# Patient Record
(demographics unavailable — no encounter records)

---

## 2017-03-18 NOTE — RAD
HISTORY:

chest pain  



COMPARISON:

Comparison chest 05/10/2016 kidney may



TECHNIQUE:

Chest PA and lateral



FINDINGS:



LUNGS:

Re- demonstrated is a calcified granuloma left lateral lower lung 

field unchanged.



PLEURA:

No significant pleural effusion identified. No pneumothorax apparent.



CARDIOVASCULAR:

Normal.



OSSEOUS STRUCTURES:

No significant abnormalities.



VISUALIZED UPPER ABDOMEN:

Normal.



OTHER FINDINGS:

None.



IMPRESSION:

No acute consolidation.  Re- demonstrated is a calcified granuloma 

left lateral lower lung field

## 2017-03-18 NOTE — C.PDOC
History Of Present Illness


Patient is a 88 y/o female, with PMHx of COPD, that presents to the ED for 

evaluation of right lower back pain that radiates down to right leg for the 

last 3 days. Notes crampy thigh pain for last 3 days. Patient also complains of 

upper back pain worse on left side. Additionally, patient reports occasional 

shortness of breath for the last 2 months. Otherwise, denies any current 

shortness of breath, chest pain, dizziness, numbness, weakness, nausea, vomiting

, abdominal pain, or any other associated symptoms at this time.  





Time Seen by Provider: 17 11:19


Chief Complaint (Nursing): Back Pain


History Per: Patient


History/Exam Limitations: no limitations


Onset/Duration Of Symptoms: Days


Current Symptoms Are (Timing): Still Present


Recent travel outside of the [place]: No


Additional History Per: Patient





Past Medical History


Reviewed: Historical Data, Nursing Documentation, Vital Signs


Vital Signs: 


 Last Vital Signs











Temp  97.6 F   17 13:44


 


Pulse  75   17 13:44


 


Resp  18   17 13:44


 


BP  120/74   17 13:44


 


Pulse Ox  97   17 14:04














- Medical History


PMH: HTN


   Denies: Chronic Kidney Disease


Surgical History: Cholecystectomy


Family History: States: Unknown Family Hx





- Social History


Hx Tobacco Use: No


Hx Alcohol Use: No


Hx Substance Use: No





- Immunization History


Hx Tetanus Toxoid Vaccination: No


Hx Influenza Vaccination: No


Hx Pneumococcal Vaccination: No





Review Of Systems


Except As Marked, All Systems Reviewed And Found Negative.


Constitutional: Negative for: Fever, Chills


Cardiovascular: Negative for: Chest Pain, Palpitations


Respiratory: Negative for: Cough, Shortness of Breath


Gastrointestinal: Negative for: Nausea, Vomiting, Abdominal Pain


Genitourinary: Negative for: Dysuria, Incontinence


Musculoskeletal: Positive for: Back Pain, Leg Pain.  Negative for: Neck Pain


Neurological: Negative for: Weakness, Numbness, Headache, Dizziness





Physical Exam





- Physical Exam


Appears: Non-toxic, No Acute Distress


Skin: Normal Color, Warm, Dry, No Other (no erythema to lower extremity)


Head: Atraumatic, Normacephalic


Eye(s): bilateral: Normal Inspection, EOMI


Neck: Normal ROM, Supple


Chest: Symmetrical, No Tenderness


Cardiovascular: Rhythm Regular


Respiratory: Normal Breath Sounds, No Rales, No Rhonchi, No Wheezing


Gastrointestinal/Abdominal: Soft, No Tenderness


Back: No CVA Tenderness, No Vertebral Tenderness, Paraspinal Tenderness (

tenderness to trapezius), Other (kyphosis)


Extremity: Normal ROM, No Tenderness (no tenderness to hips or lower extremities

), No Pedal Edema, No Calf Tenderness, Capillary Refill (< 2 sec.), No Deformity

, No Swelling


Pulses: Left Dorsalis Pedis: Normal, Right Dorsalis Pedis: Normal


Neurological/Psych: Oriented x3, Normal Speech, Normal Cognition, Normal Motor, 

Normal Sensation





ED Course And Treatment





- Laboratory Results


Result Diagrams: 


 17 12:23





 17 12:23


Lab Interpretation: No Acute Changes


ECG: Interpreted By Me, Viewed By Me


ECG Rhythm: Sinus Rhythm


ECG Interpretation: No Acute Changes


Rate From EC (bpm)


O2 Sat by Pulse Oximetry: 97 (on RA)


Pulse Ox Interpretation: Normal





- Radiology


CXR: Interpreted by Me, Viewed By Me


CXR Interpretation: Yes: No Acute Disease


Progress Note: Labs, EKG, CXR ordered and reviewed. Patient was treated with IV 

fluids, and Toradol IVP in the ER. On reassessment, patient is resting 

comfortably, with no acute distress. She has no fever and stable vital signs. 

She reports improvemet of back pain. Patient is ambulatory in the emergency 

department with no signs of discomfort. Patient was advised to follow up with 

their physician in 1-2 days.





Medical Decision Making


Medical Decision Making: 


EKg





Disposition


Counseled Patient/Family Regarding: Need For Followup, Rx Given





- Disposition


Referrals: 


[facility] [Outside]


[facility] [Outside]


Disposition: HOME/ ROUTINE


Disposition Time: 13:25


Condition: STABLE


Additional Instructions: 


Vaya a jade mdico o la clnica en 2-5 steele sin falta, para mas evaluacin. Binger 

los medicamentos karime indicado. Volver a la wilfred de emergencia en cualquier 

momento si los sntomas persisten o empeoran.


Prescriptions: 


Ibuprofen [Motrin] 600 mg PO Q8 #30 tab


Instructions:  Back Pain (GEN)


Print Language: Tanzanian





- POA


Present On Arrival: None





- Clinical Impression


Clinical Impression: 


 Low back pain, Thoracic back pain





- PA / NP / Resident Statement


MD/DO has reviewed & agrees with the documentation as recorded.





- Scribe Statement


The provider has reviewed the documentation as recorded by the Scribe


Unique Alejandra





All medical record entries made by the Karynibe were at my direction and 

personally dictated by me. I have reviewed the chart and agree that the record 

accurately reflects my personal performance of the history, physical exam, 

medical decision making, and the department course for this patient. I have 

also personally directed, reviewed, and agree with the discharge instructions 

and disposition.

## 2017-03-19 NOTE — CARD
--------------- APPROVED REPORT --------------





EKG Measurement

Heart [id number]

IN [id number]

[id number]

[id number]

[id number]



<Conclusion>

Normal sinus rhythm

Low voltage QRS

Nonspecific ST abnormality

Abnormal ECG

## 2017-05-01 NOTE — C.PDOC
History Of Present Illness





88 y/o female presents to ED with complaints of  SOB and cough for few days. 

Patient also reports chest pain on breathing and cough. Denies fever or chills. 

Notes cough productive of yellow sputum and that shortness of breath has been 

occasional. Denies palpitations, abdominal pain, or other associated symptoms. 


Chief Complaint (Nursing): Cough, Cold, Congestion


History Per: Patient


History/Exam Limitations: no limitations


Current Symptoms Are (Timing): Still Present


Recent travel outside of the [place]: No





Past Medical History


Reviewed: Historical Data, Nursing Documentation, Vital Signs


Vital Signs: 


 Last Vital Signs











Temp  98.5 F   17 18:10


 


Pulse  66   17 18:10


 


Resp  18   17 18:10


 


BP  139/77   17 18:10


 


Pulse Ox  96   17 20:13














- Medical History


PMH: HTN


Surgical History: Cholecystectomy


Family History: States: Unknown Family Hx





- Social History


Hx Tobacco Use: No


Hx Alcohol Use: No


Hx Substance Use: No





- Immunization History


Hx Tetanus Toxoid Vaccination: No


Hx Influenza Vaccination: No


Hx Pneumococcal Vaccination: No





Review Of Systems


Except As Marked, All Systems Reviewed And Found Negative.


Constitutional: Negative for: Fever, Chills


Cardiovascular: Negative for: Palpitations


Respiratory: Positive for: Cough, Shortness of Breath, Sputum


Gastrointestinal: Negative for: Nausea, Vomiting, Abdominal Pain, Diarrhea


Skin: Negative for: Rash


Neurological: Negative for: Headache, Dizziness





Physical Exam





- Physical Exam


Appears: Non-toxic, No Acute Distress


Skin: Warm, Dry


Head: Atraumatic, Normacephalic


Oral Mucosa: Moist


Chest: Symmetrical


Cardiovascular: Rhythm Regular


Respiratory: No Accessory Muscle Use, No Rales, Rhonchi (expiratory, bilaterally

), No Stridor, No Wheezing


Gastrointestinal/Abdominal: Soft, No Tenderness


Back: Normal Inspection


Extremity: Normal ROM, Capillary Refill (< 2 sec. )


Neurological/Psych: Oriented x3





ED Course And Treatment





- Laboratory Results


Result Diagrams: 


 17 19:50





 17 19:50


ECG: Interpreted By Me, Viewed By Me


ECG Rhythm: Sinus Rhythm


ECG Interpretation: Normal, No Acute Changes


Interpretation Of ECG: NSR, normal tracings.


Rate From EC


O2 Sat by Pulse Oximetry: 96 (ra)


Pulse Ox Interpretation: Normal





- Radiology


CXR: Interpreted by Me, Viewed By Me


CXR Interpretation: Yes: No Acute Disease.  No: Infiltrates, Cardiomegaly





Disposition


Counseled Patient/Family Regarding: Diagnosis





- Disposition


Referrals: 


[facility] [Outside]


Disposition: HOME/ ROUTINE


Disposition Time: 20:14


Condition: STABLE


Prescriptions: 


Albuterol HFA [Ventolin HFA 90 mcg/actuation (8 g)] 1 puff IH Q6 #1 inhaler


Azithromycin [Zithromax] 500 mg PO DAILY #7 tablet


Naproxen [Naprosyn Tab] 375 mg PO TIDPC #14 tab


Promethazine DM [Phenergan DM Syrup] 5 ml PO TID #20 cup


Instructions:  Upper Respiratory Infection (ED), Acute Bronchitis (ED)


Forms:  Gen Discharge Inst Iraqi


Print Language: Danish





- POA


Present On Arrival: None





- Clinical Impression


Clinical Impression: 


 Bronchitis, Upper respiratory infection








- Scribe Statement


The provider has reviewed the documentation as recorded by the Grey Hart 





Provider Scribe Attestation:


All medical record entries made by the Karynibzev were at my direction and 

personally dictated by me. I have reviewed the chart and agree that the record 

accurately reflects my personal performance of the history, physical exam, 

medical decision making, and the department course for this patient. I have 

also personally directed, reviewed, and agree with the discharge instructions 

and disposition.

## 2017-05-02 NOTE — RAD
HISTORY:

Shortness of breath



COMPARISON:

03/18/2017



TECHNIQUE:

Chest PA and lateral



FINDINGS:



LUNGS:

Biapical pleural thickening with upper lobe granulomatous changes.  

Two small nodular densities at the lateral aspect of the left lung 

base measuring up to 6 and 4 millimeters respectively. These were 

noted on the prior study.  Correlation with chest CT may be helpful. 

No focal infiltrate or effusion.



PLEURA:

No significant pleural effusion identified. No pneumothorax apparent.



CARDIOVASCULAR:

Calcification at the aortic knob.



OSSEOUS STRUCTURES:

No significant abnormalities.



VISUALIZED UPPER ABDOMEN:

Normal.



OTHER FINDINGS:

None.



IMPRESSION:





Biapical pleural thickening with upper lobe granulomatous changes.  

Two small nodular densities at the lateral aspect of the left lung 

base measuring up to 6 and 4 millimeters respectively. These were 

noted on the prior study.  Correlation with chest CT may be helpful. 

No focal infiltrate or effusion.

## 2017-05-03 NOTE — CARD
--------------- APPROVED REPORT --------------





EKG Measurement

Heart [id number]

WA 166P63

[id number]

[id number]

[id number]



<Conclusion>

Normal sinus rhythm

Normal ECG

## 2018-03-19 NOTE — RAD
HISTORY:

cough  



COMPARISON:

5/1/2017



TECHNIQUE:

Chest PA and lateral



FINDINGS:



LUNGS:

No pulmonary infiltrate.  Calcified granuloma at left lung, unchanged.



PLEURA:

No significant pleural effusion identified. No pneumothorax apparent.



CARDIOVASCULAR:

Normal.



OSSEOUS STRUCTURES:

No significant abnormalities.



VISUALIZED UPPER ABDOMEN:

Normal.



OTHER FINDINGS:

None.



IMPRESSION:

No active disease.

## 2018-03-19 NOTE — C.PDOC
History Of Present Illness


88 year old female presents to the ER with a complaint of a dry cough for the 

past 2 weeks, associated with occasional pleuritic pain. Patient has not seen 

her PMD for her symptoms. Denies Hx of COPD, asthma, smoking, fever, dyspnea on 

exertion, SOB, or other associated symptoms.








DRY COUGH X 2 WEEKS. NO FEVER, STERLING, SOB. OCC PLEURITIC PAIN. DENIES OTHER ASSOC 

SX. DID NOT SEE PMD FOR SAME. DENIES HO COPD, ASTHMA SMOKING





EXAM


NARD NONTOXIC


LUNGS OCC DRY COUGH CTA B/L NO W/R/R


REMAINDER NEG


Time Seen by Provider: 03/19/18 09:01


Chief Complaint (Nursing): Cough, Cold, Congestion


History Per: Patient


History/Exam Limitations: no limitations


Onset/Duration Of Symptoms: Days


Current Symptoms Are (Timing): Still Present


Location Of Pain: None


Sick Contacts (Context): None


Associated Symptoms: Cough, Other ((+) Occasional pleuritic pain. (-) STERLING, SOB)

.  denies: Fever, Sputum


Ear Symptoms: Bilateral: None


Recent travel outside of the [place]: No





Past Medical History


Reviewed: Historical Data, Nursing Documentation, Vital Signs


Vital Signs: 


 Last Vital Signs











Temp  99.3 F   03/19/18 08:56


 


Pulse  86   03/19/18 08:56


 


Resp  18   03/19/18 08:56


 


BP  122/72   03/19/18 08:56


 


Pulse Ox  98   03/19/18 09:39














- Medical History


PMH: HTN


Surgical History: Cholecystectomy


Family History: States: Unknown Family Hx





- Social History


Hx Tobacco Use: No


Hx Alcohol Use: No


Hx Substance Use: No





- Immunization History


Hx Tetanus Toxoid Vaccination: No


Hx Influenza Vaccination: No


Hx Pneumococcal Vaccination: No





Review Of Systems


Except As Marked, All Systems Reviewed And Found Negative.


Constitutional: Negative for: Fever


Respiratory: Positive for: Cough, Pleuritic Pain (Occasional).  Negative for: 

Shortness of Breath, SOB with Excertion, Sputum





Physical Exam





- Physical Exam


Appears: Non-toxic, Other (No acute respiratory distress)


Skin: Normal Color, Warm, Dry


Head: Atraumatic, Normacephalic


Eye(s): bilateral: Normal Inspection


Oral Mucosa: Moist


Throat: Normal, No Erythema, No Exudate


Chest: Symmetrical, No Tenderness


Cardiovascular: Rhythm Regular


Respiratory: No Rales, No Rhonchi, No Wheezing, Other (Occasional dry cough)


Neurological/Psych: Oriented x3, Normal Speech





ED Course And Treatment


O2 Sat by Pulse Oximetry: 98


Pulse Ox Interpretation: Normal





- Radiology


CXR: Interpreted by Me


CXR Interpretation: Yes: No Acute Disease





Medical Decision Making


Medical Decision Making: 





Plan:


* CXR





Disposition


Counseled Patient/Family Regarding: Studies Performed, Diagnosis, Need For 

Followup, Rx Given





- Disposition


Referrals: 


YOUR,PMD [Other]


Disposition: HOME/ ROUTINE


Disposition Time: 09:39


Condition: GOOD


Prescriptions: 


Azithromycin 250 mg PO DAILY #6 tab


Benzonatate [Tessalon Perles] 200 mg PO TID PRN #15 sgl


 PRN Reason: Cough


Instructions:  Acute Bronchitis


Forms:  [employer] (English)


Print Language: Qatari





- Clinical Impression


Clinical Impression: 


 Bronchitis








- Scribe Statement


The provider has reviewed the documentation as recorded by the Karynibzev Yepez





All medical record entries made by the Karynibzev were at my direction and 

personally dictated by me. I have reviewed the chart and agree that the record 

accurately reflects my personal performance of the history, physical exam, 

medical decision making, and the department course for this patient. I have 

also personally directed, reviewed, and agree with the discharge instructions 

and disposition.

## 2018-04-06 NOTE — C.PDOC
History Of Present Illness


88 year old female presents to the ED for evaluation of cough and cold symptoms 

for 4 days. Patient reports her cough is productive of white sputum and she has 

anterior chest wall pain while coughing. Patient denies fever, chills. 


Chief Complaint (Nursing): Flu-like Symptoms


History Per: Patient


History/Exam Limitations: no limitations


Onset/Duration Of Symptoms: Days (4)


Current Symptoms Are (Timing): Still Present


Associated Symptoms: Cough, Sputum (white).  denies: Fever


Additional History Per: Patient





Past Medical History


Reviewed: Historical Data, Nursing Documentation, Vital Signs


Vital Signs: 


 Last Vital Signs











Temp  98.7 F   18 19:47


 


Pulse  103 H  18 19:47


 


Resp  20   18 19:47


 


BP  127/55 L  18 19:47


 


Pulse Ox  97   18 19:50














- Medical History


PMH: HTN


   Denies: Chronic Kidney Disease


Surgical History: Cholecystectomy


Family History: States: Unknown Family Hx





- Social History


Hx Tobacco Use: No


Hx Alcohol Use: No


Hx Substance Use: No





- Immunization History


Hx Tetanus Toxoid Vaccination: No


Hx Influenza Vaccination: Yes (2017)


Hx Pneumococcal Vaccination: No





Review Of Systems


Constitutional: Negative for: Fever, Chills


Cardiovascular: Positive for: Other (anterior chest wall pain while coughing)


Respiratory: Positive for: Cough, Sputum (white)





Physical Exam





- Physical Exam


Appears: Non-toxic, No Acute Distress


Skin: Normal Color, Warm, Dry


Head: Atraumatic, Normacephalic


Eye(s): bilateral: Normal Inspection


Oral Mucosa: Moist


Neck: Supple


Chest: Symmetrical, No Deformity, No Tenderness


Cardiovascular: Rhythm Regular, No Murmur


Respiratory: Normal Breath Sounds, No Rales, No Rhonchi, No Wheezing


Extremity: Normal ROM, Capillary Refill (less than 2 seconds )


Neurological/Psych: Oriented x3, Normal Speech, Normal Cognition





ED Course And Treatment





- Laboratory Results


Result Diagrams: 


 18 19:06





 18 19:06


ECG: Interpreted By Me, Viewed By Me


ECG Rhythm: Sinus Rhythm


ECG Interpretation: Normal, No Acute Changes


Interpretation Of ECG: NSR, normal tracings


Rate From EC


O2 Sat by Pulse Oximetry: 97 (on RA)


Pulse Ox Interpretation: Normal





- Radiology


CXR: Interpreted by Me, Viewed By Me


CXR Interpretation: Yes: No Acute Disease.  No: Infiltrates, Cardiomegaly


Progress Note: Bloodwork, CXR, EKG, Influenza A/B swab ordered and reviewed.





Disposition


Counseled Patient/Family Regarding: Diagnosis





- Disposition


Referrals: 


 at [facility] [Outside]


Disposition: HOME/ ROUTINE


Disposition Time: 19:46


Condition: STABLE


Prescriptions: 


Azithromycin 1 tab PO DAILY #4 tab


guaiFENesin/Dextromethorphan [guaiFENesin-DM] 5 ml PO TID #120 ml


Instructions:  Acute Bronchitis, Adult (DC), Upper Respiratory Infection (ED)


Forms:  [employer] (English), Gen Discharge Inst Bahamian


Print Language: Bengali





- Clinical Impression


Clinical Impression: 


 Upper respiratory infection, Bronchitis








- Scribe Statement


The provider has reviewed the documentation as recorded by the Scribe (Gale Alejandra)


Provider Attestation: 








All medical record entries made by the Scribe were at my direction and 

personally dictated by me. I have reviewed the chart and agree that the record 

accurately reflects my personal performance of the history, physical exam, 

medical decision making, and the department course for this patient. I have 

also personally directed, reviewed, and agree with the discharge instructions 

and disposition.

## 2018-04-07 NOTE — RAD
Chest x-ray two views 



History: Chest pain. 



Comparison: 03/19/2018 



Findings: 



Biapical pleural thickening with upper lobe granulomatous changes. 



Small nodule and or granuloma at lateral aspect of the left britton lung 

base. 



Focal consolidative changes seen in the infrahilar regions 

bilaterally ; right greater than left.  Clinical correlation. 



Calcification at the aortic knob. 



Degenerative changes in the spine and shoulders. 



Impression:



Biapical pleural thickening with upper lobe granulomatous changes. 



Small nodule and or granuloma at lateral aspect of the left lung 

base. 



Focal consolidative changes seen in the infrahilar regions 

bilaterally ; right greater than left.  Clinical correlation. 



Calcification at the aortic knob. 



Degenerative changes in the spine and shoulders. 



Followup exam would be helpful to confirm stability of the nodule and 

infrahilar consolidative regions.  Clinical correlation.

## 2018-04-09 NOTE — CARD
--------------- APPROVED REPORT --------------





EKG Measurement

Heart [id number]

MO 132P48

[id number]

[id number]

[id number]



<Conclusion>

Normal sinus rhythm

Low voltage QRS

Borderline ECG

## 2018-04-09 NOTE — RAD
HISTORY:

SOB  



COMPARISON:

04/06/2018



TECHNIQUE:

Chest PA and lateral



FINDINGS:



LUNGS:

No active pulmonary disease.



PLEURA:

No significant pleural effusion identified. No pneumothorax apparent.



CARDIOVASCULAR:

 No radiographic findings to suggest acute or significant 

cardiovascular disease.



OSSEOUS STRUCTURES:

No significant abnormalities.



VISUALIZED UPPER ABDOMEN:

Normal.



OTHER FINDINGS:

None.



IMPRESSION:

No active disease. No significant interval change compared to the 

prior examination(s).

## 2018-04-09 NOTE — C.PDOC
History Of Present Illness


89 y/o female with history of Pulmonary fibrosis presents to ED with complaints 

of persistent cough for 1 month. Notes she has chest pain and sob with 

persistent cough. Patient has been seen at ED twice for same symptoms, 

prescribed azithromycin without improvement. As per family at bedside patient 

has similar episodes in 2016 where she was admitted for pneumonia. Denies fever

, abdominal pain, nausea, leg swelling or any other complaints at this time. 


Time Seen by Provider: 18 17:12


Chief Complaint (Nursing): Cough, Cold, Congestion


History Per: Patient, Family


History/Exam Limitations: no limitations


Onset/Duration Of Symptoms: Days


Current Symptoms Are (Timing): Still Present


Associated Symptoms: Cough





Past Medical History


Reviewed: Historical Data, Nursing Documentation, Vital Signs


Vital Signs: 


 Last Vital Signs











Temp  97.7 F   04/10/18 07:40


 


Pulse  75   04/10/18 07:40


 


Resp  20   04/10/18 07:40


 


BP  120/69   04/10/18 07:40


 


Pulse Ox  95   04/10/18 07:40














- Medical History


PMH: HTN


Surgical History: Cholecystectomy


Family History: States: No Known Family Hx





- Social History


Hx Tobacco Use: No


Hx Alcohol Use: No


Hx Substance Use: No





- Immunization History


Hx Tetanus Toxoid Vaccination: No


Hx Influenza Vaccination: Yes (2017)


Hx Pneumococcal Vaccination: No





Review Of Systems


Constitutional: Negative for: Fever, Chills


Cardiovascular: Positive for: Chest Pain


Respiratory: Positive for: Cough.  Negative for: Shortness of Breath


Gastrointestinal: Negative for: Nausea, Vomiting


Skin: Negative for: Rash





Physical Exam





- Physical Exam


Appears: Non-toxic, No Acute Distress


Skin: Normal Color, Warm, Dry, No Rash


Head: Atraumatic, Normacephalic


Eye(s): bilateral: Normal Inspection, PERRL, EOMI


Nose: Normal


Oral Mucosa: Moist


Throat: Normal, No Erythema, No Exudate


Neck: Normal ROM, Supple


Chest: Symmetrical


Cardiovascular: Rhythm Regular


Respiratory: Normal Breath Sounds, No Rales, No Rhonchi, No Wheezing


Gastrointestinal/Abdominal: Soft, No Tenderness, No Guarding, No Rebound


Extremity: Normal ROM, Capillary Refill (<2 seconds)


Neurological/Psych: Oriented x3, Normal Speech





ED Course And Treatment





- Laboratory Results


Result Diagrams: 


 04/10/18 07:38





 04/10/18 07:38


ECG: Interpreted By Me, Viewed By Me


ECG Rhythm: Sinus Rhythm


Interpretation Of ECG: Normal Sinus Rhythm at rate 69 bpm.


Rate From EC


O2 Sat by Pulse Oximetry: 96 (RA)


Pulse Ox Interpretation: Normal


Progress Note: Blood work, ECG ordered. Albuterol, Neb treatment administered.  

On re-evaluation, pt notes mild improvement.  Case discussed with Dr Mccabe, 

agreed upon plan and treatment.  Case discussed with Dr Rivera, agreed upon 

admission.





Disposition





- Disposition


Disposition: HOSPITALIZED


Disposition Time: 18:00


Condition: STABLE





- Clinical Impression


Clinical Impression: 


 Pneumonia, Chest pain, Bronchitis








- PA / NP / Resident Statement


MD/DO has reviewed & agrees with the documentation as recorded.





- Scribe Statement


The provider has reviewed the documentation as recorded by the Karynibzev Hawthorne





All medical record entries made by the Grey were at my direction and 

personally dictated by me. I have reviewed the chart and agree that the record 

accurately reflects my personal performance of the history, physical exam, 

medical decision making, and the department course for this patient. I have 

also personally directed, reviewed, and agree with the discharge instructions 

and disposition.

## 2018-04-09 NOTE — CP.PCM.HP
History of Present Illness





- History of Present Illness


History of Present Illness: 





88 year old female with past medical history of pulmonary fibrosis presented to 

hospital for 1 month of productive cough.  Patient is accompanied with daughter 

who helps translate.  Patient had productive cough with chest congestion for 

past month.  Patient was seen in ED for these symptoms last week (4/6/18) and 

was given Zithromax which did not help her symptoms.  CXR done on 4/6/18 was 

negative for acute disease.  Patient also had Patient denies having any chest 

pain, shortness of breath, fevers, chills. 





PMD: Dr. Vega


PMH: Pulmonary Fibrosis (unknown origin)


PSH: cholecystectomy, cataracts surgery


Allergies: Penicillin


Home Meds: denies


FHX: mother and sister had breast CA


Social: denies hx of tobacco, etoh and drug use. 





Present on Admission





- Present on Admission


Any Indicators Present on Admission: No





Review of Systems





- Constitutional


Constitutional: absent: Chills, Fever





- EENT


Eyes: absent: Change in Vision, Itchy Eyes


Ears: absent: Decreased Hearing, Ear Pain


Nose/Mouth/Throat: absent: Nasal Discharge, Sinus Pain, Sore Throat





- Cardiovascular


Cardiovascular: absent: Chest Pain, Dyspnea, Leg Edema





- Respiratory


Respiratory: Cough, Wheezing, Chest Congestion.  absent: Dyspnea





- Gastrointestinal


Gastrointestinal: absent: Abdominal Pain, Constipation, Diarrhea, Nausea, 

Vomiting





- Genitourinary


Genitourinary: absent: Dysuria, Urinary Frequency





- Musculoskeletal


Musculoskeletal: absent: Back Pain, Muscle Weakness





- Integumentary


Integumentary: absent: Acne, Lesions, Rash





- Neurological


Neurological: absent: Dizziness, Headaches





- Psychiatric


Psychiatric: absent: Anxiety, Confusion, Depression





- Hematologic/Lymphatic


Hematologic: absent: Easy Bleeding, Easy Bruising





Past Patient History





- Infectious Disease


Hx of Infectious Diseases: None





- Past Medical History & Family History


Past Medical History?: Yes





- Past Social History


Smoking Status: Never Smoked


Chewing Tobacco Use: No


Cigar Use: No


Alcohol: None


Drugs: Denies


Home Situation {Lives}: With Family





- CARDIAC


Hx Hypertension: Yes





- PULMONARY


Other/Comment: PULMONARY FIBROSIS





- NEUROLOGICAL


Hx Neurological Disorder: No





- HEENT


Hx HEENT Problems: No





- RENAL


Hx Chronic Kidney Disease: No





- ENDOCRINE/METABOLIC


Hx Endocrine Disorders: No





- HEMATOLOGICAL/ONCOLOGICAL


Hx Blood Disorders: No





- INTEGUMENTARY


Hx Dermatological Problems: No





- MUSCULOSKELETAL/RHEUMATOLOGICAL


Hx Musculoskeletal Disorders: No


Hx Falls: No (DENIES)





- GASTROINTESTINAL


Hx Gastrointestinal Disorders: No





- GENITOURINARY/GYNECOLOGICAL


Hx Genitourinary Disorders: No





- PSYCHIATRIC


Hx Substance Use: No





- SURGICAL HISTORY


Hx Cholecystectomy: Yes





- ANESTHESIA


Hx Anesthesia: Yes


Hx Anesthesia Reactions: No


Hx Malignant Hyperthermia: No





Meds


Allergies/Adverse Reactions: 


 Allergies











Allergy/AdvReac Type Severity Reaction Status Date / Time


 


Penicillins AdvReac Severe ANAPHYLAXIS Verified 04/06/18 18:15














Physical Exam





- Constitutional


Appears: Non-toxic, No Acute Distress





- Head Exam


Head Exam: ATRAUMATIC





- Eye Exam


Eye Exam: EOMI





- ENT Exam


ENT Exam: Mucous Membranes Moist





- Respiratory Exam


Respiratory Exam: Wheezes (expiratory wheezing B/L ).  absent: Accessory Muscle 

Use, Rales, Rhonchi





- Cardiovascular Exam


Cardiovascular Exam: REGULAR RHYTHM, +S1, +S2.  absent: Diastolic murmur, Gallop

, Rubs, Systolic Murmur





- GI/Abdominal Exam


GI & Abdominal Exam: Normal Bowel Sounds.  absent: Distended, Firm, Guarding, 

Rigid, Soft, Tenderness





- Extremities Exam


Extremities exam: Negative for: pedal edema, tenderness





- Neurological Exam


Neurological exam: Alert, Oriented x3





- Psychiatric Exam


Psychiatric exam: Normal Affect, Normal Mood





- Skin


Skin Exam: Dry, Intact, Normal Color, Warm





Results





- Vital Signs


Recent Vital Signs: 





 Last Vital Signs











Temp  100.3 F H  04/09/18 16:21


 


Pulse  70   04/09/18 16:21


 


Resp  18   04/09/18 16:21


 


BP  123/75   04/09/18 16:21


 


Pulse Ox  96   04/09/18 19:05














- Labs


Result Diagrams: 


 04/09/18 17:34





 04/09/18 17:34


Labs: 





 Laboratory Results - last 24 hr











  04/09/18 04/09/18





  17:34 17:34


 


WBC  6.2  D 


 


RBC  4.50 


 


Hgb  13.4 


 


Hct  40.5 


 


MCV  89.9 


 


MCH  29.8 


 


MCHC  33.1 


 


RDW  13.9 


 


Plt Count  190 


 


MPV  9.4 


 


Neut % (Auto)  66.7 


 


Lymph % (Auto)  25.1 


 


Mono % (Auto)  7.6 


 


Eos % (Auto)  0.4 


 


Baso % (Auto)  0.2 


 


Neut # (Auto)  4.1 


 


Lymph # (Auto)  1.6 


 


Mono # (Auto)  0.5 


 


Eos # (Auto)  0.0 


 


Baso # (Auto)  0.0 


 


Sodium   143


 


Potassium   3.9


 


Chloride   101


 


Carbon Dioxide   29


 


Anion Gap   17


 


BUN   21 H


 


Creatinine   0.7


 


Est GFR ( Amer)   > 60


 


Est GFR (Non-Af Amer)   > 60


 


Random Glucose   102


 


Calcium   8.9


 


Total Bilirubin   0.6


 


AST   23


 


ALT   24


 


Alkaline Phosphatase   49


 


Total Creatine Kinase   136 H


 


CK-MB (Mass)   1.81


 


Troponin I   < 0.0120


 


NT-Pro-B Natriuret Pep   193


 


Total Protein   8.2


 


Albumin   4.1


 


Globulin   4.0 H


 


Albumin/Globulin Ratio   1.0














Assessment & Plan





- Assessment and Plan (Free Text)


Assessment: 





88 year old female with past medical history of pulmonary fibrosis is admitted 

for bronchitis vs. suspicious pneumonia.  Patient has low grade temp on 

admission of 100.3 degrees.  No SIRS criteria met on admission though.  CXR was 

normal and showed no evidence of active disease.  Pneumonia Severity Index was 

88 points meaning 0.9-2.8% mortality risk. On clinic exam though, patient has 

chest congestion with wheezing.  





Bronchitis vs. PNA 


-Patient received Zithromax and Avelox in the ED


- Will continue Avelox 400 mg IV q24H 


- Will check Rapid Flu and sputum cultures.  


- Will check procal level


- Tylenol prn for fevers


- Mucinex BID for congestion


- Duoneb prn





Pulmonary fibrosis


- Will continue to monitor.


- Will recommend O/P pulm follow up 





Prophylaxis


- SCDs


- Lovenox SC





Case discussed with attending, Dr. Dutta.  





- Date & Time


Date: 04/09/18


Time: 19:24

## 2018-04-10 NOTE — CP.PCM.PN
Subjective





- Date & Time of Evaluation


Date of Evaluation: 04/10/18


Time of Evaluation: 07:00





- Subjective


Subjective: 


PGY1 Medicine Note for Dr. Dutta





Patient seen and examined at bedside and in no acute distress. Patient still 

having productive cough with white phlegm especially at night. Patient says her 

breathing is better, but still feels some shortness of breath. Patient denies 

any chest pain, abdominal pain, nausea, vomiting, diarrhea, or constipation. 





Objective





- Vital Signs/Intake and Output


Vital Signs (last 24 hours): 


 











Temp Pulse Resp BP Pulse Ox


 


 97.7 F   100 H  20   120/69   96 


 


 04/10/18 07:40  04/10/18 07:45  04/10/18 07:40  04/10/18 07:40  04/10/18 09:23











- Medications


Medications: 


 Current Medications





Acetaminophen (Tylenol 325mg Tab)  650 mg PO Q6 PRN


   PRN Reason: Pain, Mild (1-3)


Albuterol/Ipratropium (Duoneb 3 Mg/0.5 Mg (3 Ml) Ud)  3 ml INH RQ4 [facility]


   Last Admin: 04/10/18 11:01 Dose:  3 ml


Enoxaparin Sodium (Lovenox)  30 mg SC DAILY [facility]


   Last Admin: 04/10/18 10:33 Dose:  30 mg


Guaifenesin (Mucinex La)  600 mg PO BID [facility]


   Last Admin: 04/10/18 10:32 Dose:  600 mg


Moxifloxacin HCl (Avelox)  400 mg PO Q24H [facility]


   Last Admin: 04/09/18 21:08 Dose:  400 mg











- Labs


Labs: 


 





 04/10/18 07:38 





 04/10/18 07:38 











- Additional Findings


Additional findings: 


- Constitutional


Appears: Non-toxic, No Acute Distress





- Head Exam


Head Exam: ATRAUMATIC





- Eye Exam


Eye Exam: EOMI





- ENT Exam


ENT Exam: Mucous Membranes Moist





- Respiratory Exam


Respiratory Exam: decreased breath sounds b/l.  absent: Accessory Muscle Use, 

Rales, Rhonchi





- Cardiovascular Exam


Cardiovascular Exam: REGULAR RHYTHM, +S1, +S2.  absent: Diastolic murmur, Gallop

, Rubs, Systolic Murmur





- GI/Abdominal Exam


GI & Abdominal Exam: Normal Bowel Sounds.  absent: Distended, Firm, Guarding, 

Rigid, Soft, Tenderness





- Extremities Exam


Extremities exam: Negative for: pedal edema, tenderness





- Neurological Exam


Neurological exam: Alert, Oriented x3





- Psychiatric Exam


Psychiatric exam: Normal Affect, Normal Mood





- Skin


Skin Exam: Dry, Intact, Normal Color, Warm





Assessment and Plan





- Assessment and Plan (Free Text)


Assessment: 


Bronchitis vs. PNA 


-Patient received Zithromax and Avelox in the ED


- Will continue Avelox 400 mg po q24H 


- Rapid flu negative


- F/u Sputum culture


- Procalc <.05


Meds:


- Tylenol prn for fevers


- Mucinex BID for congestion


- Duoneb prn





Pulmonary fibrosis


- Will continue to monitor.


- Will recommend O/P pulm follow up 


- f/u CT lung





Prophylaxis


- SCDs


- Lovenox SC





Case discussed with attending, Dr. Dutta.

## 2018-04-10 NOTE — CARD
--------------- APPROVED REPORT --------------





EKG Measurement

Heart [id number]

[id number]

[id number]

[id number]

[id number]



<Conclusion>

Normal sinus rhythm

Low voltage QRS

Borderline ECG

## 2018-04-10 NOTE — CT
PROCEDURE:  CT Chest without contrast



HISTORY:

pulm fibrosis, SOB



COMPARISON:

None.



TECHNIQUE:

Contiguous axial images were obtained through the chest without 

intravenous contrast enhancement. Sagittal and coronal 

reconstructions were performed.







Radiation dose (DLP): 399.96 mGy-cm. 



This CT exam was performed using one or more of the following dose 

reduction techniques: Automated exposure control, adjustment of the 

mA and/or kV according to patient size, and/or use of iterative 

reconstruction technique.



FINDINGS:



LUNGS:

No infiltrate.  Right lower lobe linear scar/ atelectasis.  Left 

lower lobe calcified granuloma.  No other pulmonary mass. 



MEDIASTINUM:

Unremarkable thoracic aorta. No aneurysm. Normal-sized heart.  

Coronary arterial calcification. No pericardial effusion. Main 

pulmonary artery unremarkable. No vascular congestion. No 

lymphadenopathy.



PLEURA:

No pleural fluid. No pneumothorax.



BONES:

No fracture. No destructive lesion. 



UPPER ABDOMEN:

Numerous low-density masses throughout the liver, likely cysts. No 

biliary dilatation. Curvilinear mural calcification in some of these 

cysts in the anterior inferior right lobe. Very small hiatal hernia.



OTHER FINDINGS:

None.



IMPRESSION:

No evidence of pulmonary fibrosis. No acute infiltrate. Minor 

findings as above.

## 2018-04-11 NOTE — CP.PCM.PN
Subjective





- Date & Time of Evaluation


Date of Evaluation: 04/11/18


Time of Evaluation: 07:00





- Subjective


Subjective: 


PGY1 Medicine Note for Dr. Dutta





Patient seen and examined at bedside and in no acute distress. Patient still 

having productive cough with white phlegm especially at night. Patient says her 

breathing is better, but still feels some shortness of breath. Patient denies 

any chest pain, abdominal pain, nausea, vomiting, diarrhea, or constipation. 











Objective





- Vital Signs/Intake and Output


Vital Signs (last 24 hours): 


 











Temp Pulse Resp BP Pulse Ox


 


 97.8 F   90   20   124/71   96 


 


 04/10/18 23:15  04/10/18 23:15  04/10/18 23:15  04/10/18 23:15  04/11/18 05:15











- Medications


Medications: 


 Current Medications





Acetaminophen (Tylenol 325mg Tab)  650 mg PO Q6 PRN


   PRN Reason: Pain, Mild (1-3)


Albuterol/Ipratropium (Duoneb 3 Mg/0.5 Mg (3 Ml) Ud)  3 ml INH RQ4 [facility]


   Last Admin: 04/11/18 04:49 Dose:  Not Given


Enoxaparin Sodium (Lovenox)  30 mg SC DAILY [facility]


   Last Admin: 04/10/18 10:33 Dose:  30 mg


Guaifenesin (Mucinex La)  600 mg PO BID [facility]


   Last Admin: 04/10/18 17:44 Dose:  600 mg


Moxifloxacin HCl (Avelox)  400 mg PO Q24H [facility]


   Last Admin: 04/10/18 20:40 Dose:  400 mg











- Labs


Labs: 


 





 04/10/18 07:38 





 04/10/18 07:38 











- Additional Findings


Additional findings: 


- Constitutional


Appears: Non-toxic, No Acute Distress





- Head Exam


Head Exam: ATRAUMATIC





- Eye Exam


Eye Exam: EOMI





- ENT Exam


ENT Exam: Mucous Membranes Moist





- Respiratory Exam


Respiratory Exam: decreased breath sounds b/l.  absent: Accessory Muscle Use, 

Rales, Rhonchi





- Cardiovascular Exam


Cardiovascular Exam: REGULAR RHYTHM, +S1, +S2.  absent: Diastolic murmur, Gallop

, Rubs, Systolic Murmur





- GI/Abdominal Exam


GI & Abdominal Exam: Normal Bowel Sounds.  absent: Distended, Firm, Guarding, 

Rigid, Soft, Tenderness





- Extremities Exam


Extremities exam: Negative for: pedal edema, tenderness





- Neurological Exam


Neurological exam: Alert, Oriented x3





- Psychiatric Exam


Psychiatric exam: Normal Affect, Normal Mood





- Skin


Skin Exam: Dry, Intact, Normal Color, Warm











Assessment and Plan





- Assessment and Plan (Free Text)


Assessment: 


Bronchitis vs. PNA 


-Patient received Zithromax and Avelox in the ED


- Will continue Avelox 400 mg po q24H 


- Rapid flu negative


- F/u Sputum culture


- Procalc <.05


Meds:


- Tylenol prn for fevers


- Mucinex BID for congestion


- Duoneb prn





Pulmonary fibrosis


- Will continue to monitor.


- Will recommend O/P pulm follow up 


- CT lung: Right lower lobe linear scar/ atelectasis. Left lower lobe calcified 

granuloma. No evidence of pulmonary fibrosis. No acute infiltrate.





Prophylaxis


- SCDs


- Lovenox SC





Case discussed with attending, Dr. Dutta.

## 2018-04-11 NOTE — CP.PCM.DIS
Provider





- Provider


Date of Admission: 


04/09/18 18:16





Attending physician: 


Lulu Rivera MD





Primary care physician: 


Dr. Vega


Time Spent in preparation of Discharge (in minutes): 40





Diagnosis





- Discharge Diagnosis


(1) Bronchitis


Status: Resolved   





Hospital Course





- Lab Results


Lab Results: 


 Micro Results





04/09/18 22:30   Blood   Blood Culture - Preliminary


                            NO GROWTH AFTER 24 HOURS


04/09/18 22:30   Blood   Blood Culture - Preliminary


                            NO GROWTH AFTER 24 HOURS





 Most Recent Lab Values











WBC  4.0 K/uL (4.8-10.8)  L  04/11/18  07:50    


 


RBC  4.23 Mil/uL (3.80-5.20)   04/11/18  07:50    


 


Hgb  12.6 g/dL (11.0-16.0)   04/11/18  07:50    


 


Hct  38.0 % (34.0-47.0)   04/11/18  07:50    


 


MCV  89.7 fL (81.0-99.0)   04/11/18  07:50    


 


MCH  29.8 pg (27.0-31.0)   04/11/18  07:50    


 


MCHC  33.3 g/dL (33.0-37.0)   04/11/18  07:50    


 


RDW  13.6 % (11.5-14.5)   04/11/18  07:50    


 


Plt Count  174 K/uL (130-400)   04/11/18  07:50    


 


MPV  10.1 fL (7.2-11.7)   04/11/18  07:50    


 


Neut % (Auto)  55.6 % (50.0-75.0)   04/11/18  07:50    


 


Lymph % (Auto)  30.3 % (20.0-40.0)   04/11/18  07:50    


 


Mono % (Auto)  12.0 % (0.0-10.0)  H  04/11/18  07:50    


 


Eos % (Auto)  1.7 % (0.0-4.0)   04/11/18  07:50    


 


Baso % (Auto)  0.4 % (0.0-2.0)   04/11/18  07:50    


 


Neut # (Auto)  2.2 K/uL (1.8-7.0)   04/11/18  07:50    


 


Lymph # (Auto)  1.2 K/uL (1.0-4.3)   04/11/18  07:50    


 


Mono # (Auto)  0.5 K/uL (0.0-0.8)   04/11/18  07:50    


 


Eos # (Auto)  0.1 K/uL (0.0-0.7)   04/11/18  07:50    


 


Baso # (Auto)  0.0 K/uL (0.0-0.2)   04/11/18  07:50    


 


Sodium  145 mmol/L (132-148)   04/11/18  07:50    


 


Potassium  4.2 mmol/L (3.6-5.2)   04/11/18  07:50    


 


Chloride  105 mmol/L ()   04/11/18  07:50    


 


Carbon Dioxide  27 mmol/L (22-30)   04/11/18  07:50    


 


Anion Gap  17  (10-20)   04/11/18  07:50    


 


BUN  15 mg/dL (7-17)   04/11/18  07:50    


 


Creatinine  0.8 mg/dL (0.7-1.2)   04/11/18  07:50    


 


Est GFR ( Amer)  > 60   04/11/18  07:50    


 


Est GFR (Non-Af Amer)  > 60   04/11/18  07:50    


 


Random Glucose  87 mg/dL ()   04/11/18  07:50    


 


Calcium  8.6 mg/dl (8.6-10.4)   04/11/18  07:50    


 


Phosphorus  3.6 mg/dL (2.5-4.5)   04/11/18  07:50    


 


Magnesium  1.9 mg/dL (1.6-2.3)   04/11/18  07:50    


 


Total Bilirubin  0.5 mg/dL (0.2-1.3)   04/11/18  07:50    


 


AST  17 U/L (14-36)   04/11/18  07:50    


 


ALT  20 U/L (9-52)   04/11/18  07:50    


 


Alkaline Phosphatase  46 U/L ()   04/11/18  07:50    


 


Total Creatine Kinase  136 U/L ()  H  04/09/18  17:34    


 


CK-MB (Mass)  1.81 ng/mL (0.0-3.38)   04/09/18  17:34    


 


Troponin I  < 0.0120 ng/mL (0.00-0.120)   04/09/18  17:34    


 


NT-Pro-B Natriuret Pep  193 pg/mL (0-900)   04/09/18  17:34    


 


Total Protein  7.2 g/dL (6.3-8.3)   04/11/18  07:50    


 


Albumin  3.7 g/dL (3.5-5.0)   04/11/18  07:50    


 


Globulin  3.5 gm/dL (2.2-3.9)   04/11/18  07:50    


 


Albumin/Globulin Ratio  1.1  (1.0-2.1)   04/11/18  07:50    


 


Procalcitonin  < 0.05 NG/ML (0.19-0.49)  L  04/10/18  07:38    


 


Influenza Typ A,B (EIA)  Negative for flu a/b  (NEGATIVE)   04/09/18  19:00    














- Hospital Course


Hospital Course: 


88 year old female with past medical history of pulmonary fibrosis of unknown 

origin presented to hospital with 1 month of productive cough.  Patient was 

admitted to floor for bronchitis vs suspicious pneumonia. CXR was normal 

showing no evidence of active disease. Chest CT was ordered which showed no 

evidence of pulmonary fibrosis. Incidental findings were a chronic left lower 

lobe calcified granuloma and a small hiatal hernia.  Patient was treated with 

one dose of Zithromax 500 mg as well as moxifloxacin 400mg po q24, mucinex 600 

mg po BID, duoneb 3ml q4 [facility], and lovenox 30 mg SC daily. 





Upon discharge, patient's breathing had dramatically improved. Her lungs were 

clear and she had gained some of her energy back.  Patient did state that her 

hands were slightly tremulous which was noted on initial presentation.  This 

could also have been a side effect of the duoneb treatments.  Patient was 

discharged with an albuterol inhaler to use 3-4 times/day for the next week and 

4 more days of avelox to for a treatment total of 7 days. Patient was advised 

to follow up in the clinic for management of chronic conditions.  








If condition worsens patient was advised to seek nearest emergency room. 


This is a summary of the patient's hospital course, please see chart for full 

details. 





Discharge Exam





- Head Exam


Head Exam: ATRAUMATIC, NORMAL INSPECTION, NORMOCEPHALIC





- Eye Exam


Eye Exam: EOMI, Normal appearance





- ENT Exam


ENT Exam: Mucous Membranes Moist





- Respiratory Exam


Respiratory Exam: Decreased Breath Sounds, Clear to PA & Lateral, NORMAL 

BREATHING PATTERN





- Cardiovascular Exam


Cardiovascular Exam: REGULAR RHYTHM, RRR, +S1, +S2





- GI/Abdominal Exam


GI & Abdominal Exam: Normal Bowel Sounds, Soft.  absent: Tenderness





- Extremities Exam


Extremities exam: normal inspection





- Neurological Exam


Neurological exam: Alert, Oriented x3





- Psychiatric Exam


Psychiatric exam: Normal Affect, Normal Mood





- Skin


Skin Exam: Dry, Intact, Normal Color, Warm





Discharge Plan





- Discharge Medications


Prescriptions: 


Albuterol HFA [Ventolin HFA 90 mcg/actuation (8 g)] 1 puff IH Q4H PRN #1 inhaler


 PRN Reason: Shortness Of Breath


Moxifloxacin [Avelox] 400 mg PO Q24H #4 tab





- Follow Up Plan


Condition: STABLE


Disposition: HOME/ ROUTINE


Instructions:  Heart Healthy Diet, Acute Bronchitis, Adult (DC), Moxifloxacin (

Systemic), Pneumonia, Adult (DC), Chest Pain (DC), Albuterol


Additional Instructions: 


Patient stable for discharge as per Dr. Dutta. Patient to take Moxifloxicin 

400mg by mouth daily for 4 more days. Patient to albuterol inhaler 3-4 times 

per day as needed for shortness of breath. Patient to please follow up with the 

[facility] within one week. Patient to return to ED if symptoms 

return/ worsen. 


Referrals: 


[facility] [Provider Group]

## 2018-06-03 NOTE — C.PDOC
History Of Present Illness


87 y/o female presents to the ED for rash to the anterior right thigh. She 

reports it began 3-4 days ago. Patient complains of extreme itchiness in a 

small circular appearance. She states she continued to itch the area causing it 

to blow up to about a 6x6 cm [place]. Denies any fever, chills, nausea, vomiting

, and has no other medical complaints.





PMD:none provided


Time Seen by Provider: 06/03/18 11:09


Chief Complaint (Nursing): Abnormal Skin Integrity


History Per: Patient


History/Exam Limitations: no limitations


Onset/Duration Of Symptoms: Days


Current Symptoms Are (Timing): Still Present


Location Of Injury: Right: Thigh, Anterior: Thigh


Quality Of Symptoms: Itching


Recent travel outside of the [place]: No





Past Medical History


Reviewed: Historical Data, Nursing Documentation, Vital Signs


Vital Signs: 


 Last Vital Signs











Temp  98.5 F   06/03/18 10:56


 


Pulse  76   06/03/18 10:56


 


Resp  18   06/03/18 10:56


 


BP  138/81   06/03/18 10:56


 


Pulse Ox  96   06/03/18 11:41














- Medical History


PMH: HTN


   Denies: Chronic Kidney Disease


Surgical History: Cholecystectomy


Family History: States: Unknown Family Hx





- Social History


Hx Tobacco Use: No


Hx Alcohol Use: No


Hx Substance Use: No





- Immunization History


Hx Tetanus Toxoid Vaccination: No


Hx Influenza Vaccination: Yes (2017)


Hx Pneumococcal Vaccination: No





Review Of Systems


Except As Marked, All Systems Reviewed And Found Negative.


Constitutional: Negative for: Fever, Chills


Gastrointestinal: Negative for: Nausea, Vomiting


Skin: Positive for: Rash (anterior right thigh)





Physical Exam





- Physical Exam


Appears: Non-toxic, No Acute Distress


Skin: No Normal Color (large ring darkened interior which is pleuritic with 

sattelite lesions around that are consistent with a fungal infection)


Head: Atraumatic


Eye(s): bilateral: Normal Inspection, PERRL, EOMI


Neurological/Psych: Oriented x3





ED Course And Treatment


O2 Sat by Pulse Oximetry: 96 (RA)


Pulse Ox Interpretation: Normal





Medical Decision Making


Medical Decision Making: 


Time: 10:56





Impression: Ring worm versus cellulitis





Initial Plan:


* Evaluation





Patient was treated with an anti-fungal cream.





--------------------------------------------------------------------------------

-----------------


Scribe Attestation:


Documented by Matilde Valenzuela acting as a scribe Ernesto Hurd MD.





MD Bossibzev Attestation: 


All medical record entries made by the Scribe were at my direction and 

personally dictated by me. I have reviewed the chart and agree that the record 

accurately reflects my personal performance of the history, physical exam, 

medical decision making, and the department course for this patient. I have 

also personally directed, reviewed, and agree with the discharge instructions 

and disposition.





Disposition


Counseled Patient/Family Regarding: Diagnosis, Need For Followup, Rx Given





- Disposition


Disposition: HOME/ ROUTINE


Disposition Time: 11:38


Condition: STABLE


Prescriptions: 


Clotrimazole [Athlete's Foot] 1 gm TP BID #35.4 gm


Instructions:  Ringworm


Forms:  [employer] ([place])





- POA


Present On Arrival: None





- Clinical Impression


Clinical Impression: 


 Ringworm

## 2018-10-03 NOTE — C.PDOC
History Of Present Illness


The patient reports that a plate fell onto her left lower leg 1 week ago. The 

patient reports that she still has pain and swelling prompting visit. The 

patient is able to ambulate but with pain. Denies other injuries, numbness, 

weakness. 


Time Seen by Provider: 10/03/18 19:04


Chief Complaint (Nursing): Lower Extremity Problem/Injury


History Per: Patient, Family (Daughter)


History/Exam Limitations: no limitations


Onset/Duration Of Symptoms: Persistent


Current Symptoms Are (Timing): Still Present


Severity: Mild


Pain Scale Rating Of: 3


Recent travel outside of the [place]: No





Past Medical History


Reviewed: Historical Data, Nursing Documentation, Vital Signs


Vital Signs: 





                                Last Vital Signs











Temp  97.6 F   10/03/18 18:27


 


Pulse  71   10/03/18 18:27


 


Resp  16   10/03/18 18:27


 


BP  156/78 H  10/03/18 18:27


 


Pulse Ox  98   10/03/18 18:27














- Medical History


PMH: HTN


   Denies: Chronic Kidney Disease


Surgical History: Cholecystectomy


Family History: States: Unknown Family Hx





- Social History


Hx Tobacco Use: No


Hx Alcohol Use: No


Hx Substance Use: No





- Immunization History


Hx Tetanus Toxoid Vaccination: No


Hx Influenza Vaccination: Yes (2017)


Hx Pneumococcal Vaccination: No





Review Of Systems


Constitutional: Negative for: Fever, Weakness


Cardiovascular: Negative for: Chest Pain, Palpitations


Respiratory: Negative for: Cough


Gastrointestinal: Negative for: Vomiting, Abdominal Pain


Musculoskeletal: Positive for: Leg Pain.  Negative for: Neck Pain


Skin: Negative for: Rash, Lesions


Neurological: Negative for: Weakness, Numbness





Physical Exam





- Physical Exam


Appears: Non-toxic, No Acute Distress


Skin: Normal Color, Warm, No Rash


Head: Atraumatic, Normacephalic


Eye(s): bilateral: Normal Inspection


Oral Mucosa: Moist


Neck: Normal ROM, Supple


Chest: Symmetrical


Respiratory: No Accessory Muscle Use


Extremity: Normal ROM, Capillary Refill (< 2 sec), No Deformity, Other ((+) Mild

swelling and tenderness to the anterior left lower leg, with ecchymosis. Mild 

swelling to the dorsal foot. )


Extremity: Bilateral: Normal Color And Temperature


Pulses: Left Dorsalis Pedis: Normal, Right Dorsalis Pedis: Normal


Neurological/Psych: Oriented x3, Normal Motor, Normal Sensation


Gait: Steady





ED Course And Treatment


O2 Sat by Pulse Oximetry: 98 (on Ra)


Pulse Ox Interpretation: Normal


Progress Note: Xrays were performed and are negative for fracture or 

dislocation. Ace wrap was applied by ED tech and checked. On re-exam, the 

patient is ambulatory with ease and steady gait.





Disposition





- Disposition


Referrals: 


[facility] [Outside]


[facility] [Outside]


Disposition: HOME/ ROUTINE


Disposition Time: 20:11


Condition: STABLE


Additional Instructions: 


Follow up with the Podiatrist as needed. return if worsened. 


Instructions:  Contusion (DC)


Forms:  [email or web address] (English)


Print Language: Maori





- Clinical Impression


Clinical Impression: 


 Contusion of leg, left

## 2018-10-04 NOTE — RAD
Date of service: 



10/03/2018



PROCEDURE:  



HISTORY:

foot injury, r/o fracture



COMPARISON:

None



TECHNIQUE:

Three views



FINDINGS:

.  Hammertoe orientations given available views.



First metatarsal-phalangeal joint arthrosis present.



No fracture or dislocation appreciated.



IMPRESSION:

No fracture or dislocation



Other findings as above.

## 2018-10-04 NOTE — RAD
Date of service: 



10/03/2018



PROCEDURE:  Radiographs of the left tibia and fibula. 



HISTORY:

injury to the mid tib/fib



COMPARISON:

None available.



TECHNIQUE:

Frontal and lateral views obtained. 



FINDINGS:



BONES:

No fracture or destructive lesion.



JOINT SPACES:

Unremarkable.



OTHER FINDINGS:

None.



IMPRESSION:

Unremarkable radiographs of the left tibia and fibula.

## 2018-12-15 NOTE — RAD
Date of service: 



12/15/2018



PROCEDURE:  Radiographs of the chest and bilateral ribs



HISTORY:

chest wall pain



COMPARISON:

Chest radiographs 12/10/2018. 



TECHNIQUE:

Frontal radiograph of the chest and multiple oblique radiographs of 

the bilateral ribs were obtained.



FINDINGS:



RIGHT RIBS:

No fracture or focal lesion visualized.



LEFT RIBS:

No fracture or focal lesion visualized.



LUNGS:

Calcified granuloma again seen the inferior left lung zone.  

Pulmonary volumes appear diminished.  Trace left pleural effusion in 

question.  No pneumothorax bilaterally.



PLEURA:

As above.



CARDIOVASCULAR:

Normal cardiac size. No pulmonary vascular congestion. 



Calcific atherosclerotic changes are seen related to the thoracic 

aorta.



OTHER FINDINGS:

None.



IMPRESSION:

Unremarkable radiographs of the chest and bilateral ribs. No rib 

fracture.

## 2018-12-17 NOTE — CARD
--------------- APPROVED REPORT --------------





Date of service: 12/15/2018



EKG Measurement

Heart [id number]

AR [id number]

[id number]

[id number]

[id number]



<Conclusion>

Normal sinus rhythm

Normal ECG

## 2023-05-09 NOTE — C.PDOC
History Of Present Illness





88 y/o female presents to ED complaining of chest pain since last Wednesday. 

Patient states her granddaughter came over and wanted her to dance with her. 

Granddaughter grabbed her around the chest and lifted her up and since then, she

has had chest pain along her costal margins bilaterally, left worse than right. 

If resting, patient isnt uncomfortable but the pain worsens with movement.  She

denies coughing, SOB, dizziness, or lightheadedness. Patient has not taken 

anything for the pain. 





Time Seen by Provider: 12/15/18 15:53


Chief Complaint (Nursing): Abdominal Pain


History Per: Patient


History/Exam Limitations: no limitations


Onset/Duration Of Symptoms: Days


Current Symptoms Are (Timing): Still Present





Past Medical History


Reviewed: Historical Data, Nursing Documentation, Vital Signs


Vital Signs: 





                                Last Vital Signs











Temp  98.5 F   12/15/18 15:36


 


Pulse  73   12/15/18 15:36


 


Resp  16   12/15/18 15:36


 


BP  129/75   12/15/18 15:36


 


Pulse Ox  96   12/15/18 15:36














- Medical History


PMH: HTN


   Denies: Chronic Kidney Disease


Surgical History: Cholecystectomy


Family History: States: No Known Family Hx





- Social History


Hx Tobacco Use: No


Hx Alcohol Use: No


Hx Substance Use: No





- Immunization History


Hx Tetanus Toxoid Vaccination: No


Hx Influenza Vaccination: Yes (Sept 2018)


Hx Pneumococcal Vaccination: Yes (2018)





Review Of Systems


Cardiovascular: Positive for: Chest Pain (along costal margins bilaterally, L 

worse than R).  Negative for: Light Headedness


Respiratory: Negative for: Cough, Shortness of Breath


Neurological: Negative for: Dizziness





Physical Exam





- Physical Exam


Appears: Non-toxic, No Acute Distress


Skin: Warm, Dry


Head: Atraumatic, Normacephalic


Eye(s): bilateral: Normal Inspection


Oral Mucosa: Moist


Neck: Supple


Chest: No Ecchymosis


Cardiovascular: Rhythm Regular, No Murmur


Respiratory: Normal Breath Sounds, No Rales, No Rhonchi, No Wheezing


Gastrointestinal/Abdominal: Soft, Tenderness (tenderness to palpation on left 

and right upper quadrants)


Extremity: Tenderness (in lower anterior and lateral ribs on left and mildly on 

the right)





ED Course And Treatment


O2 Sat by Pulse Oximetry: 96 (RA)


Pulse Ox Interpretation: Normal





- Other Rad


  ** Bilateral ribs and CXR


X-Ray: Interpreted by Me


Interpretation: No evidence of fracture,effusion or pneumothorax


Progress Note: Patient treated with Percocet for pain.


Reevaluation Time: 17:15


Reassessment Condition: Improved





Medical Decision Making


Medical Decision Making: 





Plan:


--EKG


--Ribs X-Ray


--Percocet 








Disposition


Counseled Patient/Family Regarding: Studies Performed, Diagnosis, Need For 

Followup, Rx Given





- Disposition


Referrals: 


Ester Vega MD [Staff Provider] - 


Disposition: HOME/ ROUTINE


Disposition Time: 17:18


Condition: STABLE


Additional Instructions: 


Give Tylenol 2 tablets every 4 hours during the day for pain if needed.


Prescriptions: 


Tramadol HCl [Ultram] 50 mg PO QID PRN #14 tablet


 PRN Reason: Pain, Severe (8-10)


Instructions:  Bruised Rib


Forms:  [employer] (Guamanian)


Print Language: Uzbek





- Clinical Impression


Clinical Impression: 


 Bruised ribs








- Scribe Statement


The provider has reviewed the documentation as recorded by the Grey Sood





Provider Attestation: 





All medical record entries made by the Grey were at my direction and 

personally dictated by me. I have reviewed the chart and agree that the record 

accurately reflects my personal performance of the history, physical exam, 

medical decision making, and the department course for this patient. I have also

 personally directed, reviewed, and agree with the discharge instructions and 

disposition. PAST SURGICAL HISTORY:  No significant past surgical history